# Patient Record
Sex: MALE | Race: WHITE | NOT HISPANIC OR LATINO | Employment: FULL TIME | RURAL
[De-identification: names, ages, dates, MRNs, and addresses within clinical notes are randomized per-mention and may not be internally consistent; named-entity substitution may affect disease eponyms.]

---

## 2017-01-27 ENCOUNTER — OFFICE VISIT (OUTPATIENT)
Dept: RETAIL CLINIC | Facility: CLINIC | Age: 32
End: 2017-01-27

## 2017-01-27 VITALS
HEART RATE: 82 BPM | RESPIRATION RATE: 15 BRPM | BODY MASS INDEX: 21.23 KG/M2 | HEIGHT: 74 IN | OXYGEN SATURATION: 99 % | TEMPERATURE: 98.2 F | WEIGHT: 165.4 LBS

## 2017-01-27 DIAGNOSIS — J01.00 ACUTE MAXILLARY SINUSITIS, RECURRENCE NOT SPECIFIED: Primary | ICD-10-CM

## 2017-01-27 DIAGNOSIS — H65.03 BILATERAL ACUTE SEROUS OTITIS MEDIA, RECURRENCE NOT SPECIFIED: ICD-10-CM

## 2017-01-27 DIAGNOSIS — J02.9 SORE THROAT: ICD-10-CM

## 2017-01-27 LAB
EXPIRATION DATE: NORMAL
INTERNAL CONTROL: NORMAL
Lab: NORMAL
S PYO AG THROAT QL: NEGATIVE

## 2017-01-27 PROCEDURE — 87880 STREP A ASSAY W/OPTIC: CPT | Performed by: NURSE PRACTITIONER

## 2017-01-27 PROCEDURE — 99203 OFFICE O/P NEW LOW 30 MIN: CPT | Performed by: NURSE PRACTITIONER

## 2017-01-27 RX ORDER — AMOXICILLIN AND CLAVULANATE POTASSIUM 875; 125 MG/1; MG/1
1 TABLET, FILM COATED ORAL 2 TIMES DAILY
Qty: 20 TABLET | Refills: 0 | Status: SHIPPED | OUTPATIENT
Start: 2017-01-27 | End: 2017-02-06

## 2017-01-27 RX ORDER — ALUMINUM HYDROXIDE, MAGNESIUM HYDROXIDE, SIMETHICONE 400; 400; 40 MG/10ML; MG/10ML; MG/10ML
30 SUSPENSION ORAL
Qty: 30 ML | Refills: 0 | Status: SHIPPED | OUTPATIENT
Start: 2017-01-27

## 2017-01-27 RX ORDER — LORATADINE 10 MG/1
10 TABLET ORAL DAILY
Qty: 30 TABLET | Refills: 0 | Status: SHIPPED | OUTPATIENT
Start: 2017-01-27 | End: 2017-02-26

## 2017-01-27 NOTE — PATIENT INSTRUCTIONS
Sinusitis, Adult  Sinusitis is redness, soreness, and puffiness (inflammation) of the air pockets in the bones of your face (sinuses). The redness, soreness, and puffiness can cause air and mucus to get trapped in your sinuses. This can allow germs to grow and cause an infection.   HOME CARE   · Drink enough fluids to keep your pee (urine) clear or pale yellow.  · Use a humidifier in your home.  · Run a hot shower to create steam in the bathroom. Sit in the bathroom with the door closed. Breathe in the steam 3-4 times a day.  · Put a warm, moist washcloth on your face 3-4 times a day, or as told by your doctor.  · Use salt water sprays (saline sprays) to wet the thick fluid in your nose. This can help the sinuses drain.  · Only take medicine as told by your doctor.  GET HELP RIGHT AWAY IF:   · Your pain gets worse.  · You have very bad headaches.  · You are sick to your stomach (nauseous).  · You throw up (vomit).  · You are very sleepy (drowsy) all the time.  · Your face is puffy (swollen).  · Your vision changes.  · You have a stiff neck.  · You have trouble breathing.  MAKE SURE YOU:   · Understand these instructions.  · Will watch your condition.  · Will get help right away if you are not doing well or get worse.     This information is not intended to replace advice given to you by your health care provider. Make sure you discuss any questions you have with your health care provider.     Document Released: 06/05/2009 Document Revised: 01/08/2016 Document Reviewed: 07/23/2013  [employer] Interactive Patient Education ©2016 [employer]  Salt Water Gargle  This solution will help make your mouth and throat feel better.  HOME CARE INSTRUCTIONS   · Mix 1 teaspoon of salt in 8 ounces of warm water.  · Gargle with this solution as much or often as you need or as directed. Swish and gargle gently if you have any sores or wounds in your mouth.  · Do not swallow this mixture.     This information is not intended to  replace advice given to you by your health care provider. Make sure you discuss any questions you have with your health care provider.     Document Released: 09/21/2005 Document Revised: 03/11/2013 Document Reviewed: 02/12/2010  [employer] Interactive Patient Education ©2016 [employer].    Sore Throat  A sore throat is a painful, burning, sore, or scratchy feeling of the throat. There may be pain or tenderness when swallowing or talking. You may have other symptoms with a sore throat. These include coughing, sneezing, fever, or a swollen neck. A sore throat is often the first sign of another sickness. These sicknesses may include a cold, flu, strep throat, or an infection called mono. Most sore throats go away without medical treatment.   HOME CARE   · Only take medicine as told by your doctor.  · Drink enough fluids to keep your pee (urine) clear or pale yellow.  · Rest as needed.  · Try using throat sprays, lozenges, or suck on hard candy (if older than 4 years or as told).  · Sip warm liquids, such as broth, herbal tea, or warm water with honey. Try sucking on frozen ice pops or drinking cold liquids.  · Rinse the mouth (gargle) with salt water. Mix 1 teaspoon salt with 8 ounces of water.  · Do not smoke. Avoid being around others when they are smoking.  · Put a humidifier in your bedroom at night to moisten the air. You can also turn on a hot shower and sit in the bathroom for 5-10 minutes. Be sure the bathroom door is closed.  GET HELP RIGHT AWAY IF:   · You have trouble breathing.  · You cannot swallow fluids, soft foods, or your spit (saliva).  · You have more puffiness (swelling) in the throat.  · Your sore throat does not get better in 7 days.  · You feel sick to your stomach (nauseous) and throw up (vomit).  · You have a fever or lasting symptoms for more than 2-3 days.  · You have a fever and your symptoms suddenly get worse.  MAKE SURE YOU:   · Understand these instructions.  · Will watch your  condition.  · Will get help right away if you are not doing well or get worse.     This information is not intended to replace advice given to you by your health care provider. Make sure you discuss any questions you have with your health care provider.     INSTRUCTIONS FOR MAGIC MOUTHWASH: MIX THE VISCOUS LIDOCAINE, BENADRYL, MAALOX AND NYSTATIN TOGETHER IN ONE BOTTLE; MIX WELL PRIOR TO EACH USE; MAY SWISH AND SPIT OR SWISH AND SWALLOW 1 TEASPOON (5ml) EVERY 4 HOURS AS NEEDED FOR SORE THROAT. PATIENT VERBALIZES UNDERSTANDING.-EN    Document Released: 09/26/2009 Document Revised: 09/11/2013 Document Reviewed: 08/25/2013  [employer] Interactive Patient Education ©2016 Elsevier Inc.

## 2017-01-27 NOTE — PROGRESS NOTES
Subjective   Oscar Lizama is a 31 y.o. male that c/o sore throat, neck swelling, headache and sinus pressure that started approximately 1 week ago. Denies recent exposure to known ill persons.     Sore Throat    This is a new problem. The current episode started in the past 7 days. The problem has been gradually worsening. Neither side of throat is experiencing more pain than the other. The maximum temperature recorded prior to his arrival was 100.4 - 100.9 F. The fever has been present for 1 to 2 days. The pain is at a severity of 4/10. The pain is moderate. Associated symptoms include congestion, headaches and swollen glands. Pertinent negatives include no abdominal pain, coughing, diarrhea, ear pain, shortness of breath, stridor, trouble swallowing or vomiting. He has had no exposure to strep or mono. He has tried nothing for the symptoms.        The following portions of the patient's history were reviewed and updated as appropriate: allergies, current medications, past family history, past medical history, past social history, past surgical history and problem list.    Review of Systems   Constitutional: Positive for fever. Negative for appetite change, chills and fatigue.   HENT: Positive for congestion, postnasal drip, sinus pressure and sore throat. Negative for ear pain, sneezing and trouble swallowing.    Eyes: Negative for redness.   Respiratory: Negative for cough, shortness of breath and stridor.    Cardiovascular: Negative for palpitations.   Gastrointestinal: Negative for abdominal pain, diarrhea, nausea and vomiting.   Musculoskeletal: Negative for myalgias.   Skin: Negative for rash.   Neurological: Positive for headaches. Negative for dizziness.       Objective   Physical Exam   Constitutional: He is oriented to person, place, and time. He appears well-developed and well-nourished.   HENT:   Head: Normocephalic and atraumatic.   Right Ear: Hearing, external ear and ear canal normal. Tympanic  membrane is not erythematous. A middle ear effusion (mild) is present.   Left Ear: Hearing, external ear and ear canal normal. Tympanic membrane is not erythematous. A middle ear effusion (mild) is present.   Nose: Mucosal edema (moderate) present. Right sinus exhibits maxillary sinus tenderness and frontal sinus tenderness. Left sinus exhibits maxillary sinus tenderness and frontal sinus tenderness.   Mouth/Throat: Mucous membranes are dry. No uvula swelling. Posterior oropharyngeal erythema (moderate) present. No oropharyngeal exudate.       Tonsils absent; small circular oral lesion noted; no blistering noted     Eyes: Conjunctivae are normal. Pupils are equal, round, and reactive to light.   Neck: Normal range of motion.   Cardiovascular: Normal rate, regular rhythm and normal heart sounds.    No murmur heard.  Pulmonary/Chest: Effort normal and breath sounds normal. No respiratory distress.   Lymphadenopathy:     He has cervical adenopathy (bilateral anterior).   Neurological: He is alert and oriented to person, place, and time.   Skin: Skin is warm and dry. No rash noted.   Psychiatric: He has a normal mood and affect. His behavior is normal. Judgment and thought content normal.   Vitals reviewed.      Assessment/Plan   Oscar was seen today for sore throat.    Diagnoses and all orders for this visit:    Sore throat  -     POC Rapid Strep A  -     lidocaine viscous (XYLOCAINE) 2 % solution; Take 5 mL by mouth As Needed for mild pain (1-3).  -     nystatin (MYCOSTATIN) 323529 UNIT/ML suspension; Swish and swallow 5 mL 4 (Four) Times a Day.  -     diphenhydrAMINE (BENADRYL) 12.5 MG/5ML liquid; Take 10 mL by mouth 4 (Four) Times a Day As Needed (sore throat).  -     aluminum-magnesium hydroxide-simethicone (MAALOX) 200-200-20 MG/5ML suspension; Take 30 mL by mouth 4 (Four) Times a Day Before Meals & at Bedtime.    Bilateral acute serous otitis media, recurrence not specified  -     loratadine (CLARITIN) 10 MG  tablet; Take 1 tablet by mouth Daily for 30 days.    Acute maxillary sinusitis, recurrence not specified  -     amoxicillin-clavulanate (AUGMENTIN) 875-125 MG per tablet; Take 1 tablet by mouth 2 (Two) Times a Day for 10 days.    Instructed patient regarding medication use. Patient verbalizes understanding.-EN

## 2022-11-27 NOTE — MR AVS SNAPSHOT
Oscar Lizama   1/27/2017 10:30 AM   Office Visit    Dept Phone:  [phone]   Encounter #:  [id number]    Provider:  SITA DOMINIQUE   Department:  [facility]                Your Full Care Plan              Today's Medication Changes          These changes are accurate as of: 1/27/17 11:24 AM.  If you have any questions, ask your nurse or doctor.               New Medication(s)Ordered:     aluminum-magnesium hydroxide-simethicone 200-200-20 MG/5ML suspension   Commonly known as:  MAALOX   Take 30 mL by mouth 4 (Four) Times a Day Before Meals & at Bedtime.       amoxicillin-clavulanate 875-125 MG per tablet   Commonly known as:  AUGMENTIN   Take 1 tablet by mouth 2 (Two) Times a Day for 10 days.       diphenhydrAMINE 12.5 MG/5ML liquid   Commonly known as:  BENADRYL   Take 10 mL by mouth 4 (Four) Times a Day As Needed (sore throat).       lidocaine viscous 2 % solution   Commonly known as:  XYLOCAINE   Take 5 mL by mouth As Needed for mild pain (1-3).       loratadine 10 MG tablet   Commonly known as:  CLARITIN   Take 1 tablet by mouth Daily for 30 days.       nystatin 206193 UNIT/ML suspension   Commonly known as:  MYCOSTATIN   Swish and swallow 5 mL 4 (Four) Times a Day.            Where to Get Your Medications      These medications were sent to [facility] [address] [place], [place] [address]ON [facility] - [phone]  - [phone] [facility] [employer] [facility] [address]     Phone:  [phone]     aluminum-magnesium hydroxide-simethicone 200-200-20 MG/5ML suspension    amoxicillin-clavulanate 875-125 MG per tablet    diphenhydrAMINE 12.5 MG/5ML liquid    lidocaine viscous 2 % solution    loratadine 10 MG tablet    nystatin 215043 UNIT/ML suspension                  Your Updated Medication List          This list is accurate as of: 1/27/17 11:24 AM.  Always use your most recent med list.                aluminum-magnesium hydroxide-simethicone 200-200-20 MG/5ML suspension      Commonly known as:  MAALOX   Take 30 mL by mouth 4 (Four) Times a Day Before Meals & at Bedtime.       amoxicillin-clavulanate 875-125 MG per tablet   Commonly known as:  AUGMENTIN   Take 1 tablet by mouth 2 (Two) Times a Day for 10 days.       diphenhydrAMINE 12.5 MG/5ML liquid   Commonly known as:  BENADRYL   Take 10 mL by mouth 4 (Four) Times a Day As Needed (sore throat).       lidocaine viscous 2 % solution   Commonly known as:  XYLOCAINE   Take 5 mL by mouth As Needed for mild pain (1-3).       loratadine 10 MG tablet   Commonly known as:  CLARITIN   Take 1 tablet by mouth Daily for 30 days.       nystatin [id number] UNIT/ML suspension   Commonly known as:  MYCOSTATIN   Swish and swallow 5 mL 4 (Four) Times a Day.               We Performed the Following     POC Rapid Strep A       You Were Diagnosed With        Codes Comments    Sore throat    -  Primary ICD-10-CM: J02.9  ICD-9-CM: 462     Bilateral acute serous otitis media, recurrence not specified     ICD-10-CM: H65.03  ICD-9-CM: 381.01     Acute maxillary sinusitis, recurrence not specified     ICD-10-CM: J01.00  ICD-9-CM: 461.0       Instructions    Sinusitis, Adult  Sinusitis is redness, soreness, and puffiness (inflammation) of the air pockets in the bones of your face (sinuses). The redness, soreness, and puffiness can cause air and mucus to get trapped in your sinuses. This can allow germs to grow and cause an infection.   HOME CARE   · Drink enough fluids to keep your pee (urine) clear or pale yellow.  · Use a humidifier in your home.  · Run a hot shower to create steam in the bathroom. Sit in the bathroom with the door closed. Breathe in the steam 3-4 times a day.  · Put a warm, moist washcloth on your face 3-4 times a day, or as told by your doctor.  · Use salt water sprays (saline sprays) to wet the thick fluid in your nose. This can help the sinuses drain.  · Only take medicine as told by your doctor.  GET HELP RIGHT AWAY IF:   · Your pain gets  worse.  · You have very bad headaches.  · You are sick to your stomach (nauseous).  · You throw up (vomit).  · You are very sleepy (drowsy) all the time.  · Your face is puffy (swollen).  · Your vision changes.  · You have a stiff neck.  · You have trouble breathing.  MAKE SURE YOU:   · Understand these instructions.  · Will watch your condition.  · Will get help right away if you are not doing well or get worse.     This information is not intended to replace advice given to you by your health care provider. Make sure you discuss any questions you have with your health care provider.     Document Released: 06/05/2009 Document Revised: 01/08/2016 Document Reviewed: 07/23/2013  [employer] Patient Education ©2016 [employer] Inc.  Salt Water Gargle  This solution will help make your mouth and throat feel better.  HOME CARE INSTRUCTIONS   · Mix 1 teaspoon of salt in 8 ounces of warm water.  · Gargle with this solution as much or often as you need or as directed. Swish and gargle gently if you have any sores or wounds in your mouth.  · Do not swallow this mixture.     This information is not intended to replace advice given to you by your health care provider. Make sure you discuss any questions you have with your health care provider.     Document Released: 09/21/2005 Document Revised: 03/11/2013 Document Reviewed: 02/12/2010  [employer] Patient Education ©2016 [employer] Inc.    Sore Throat  A sore throat is a painful, burning, sore, or scratchy feeling of the throat. There may be pain or tenderness when swallowing or talking. You may have other symptoms with a sore throat. These include coughing, sneezing, fever, or a swollen neck. A sore throat is often the first sign of another sickness. These sicknesses may include a cold, flu, strep throat, or an infection called mono. Most sore throats go away without medical treatment.   HOME CARE   · Only take medicine as told by your doctor.  · Drink enough  fluids to keep your pee (urine) clear or pale yellow.  · Rest as needed.  · Try using throat sprays, lozenges, or suck on hard candy (if older than 4 years or as told).  · Sip warm liquids, such as broth, herbal tea, or warm water with honey. Try sucking on frozen ice pops or drinking cold liquids.  · Rinse the mouth (gargle) with salt water. Mix 1 teaspoon salt with 8 ounces of water.  · Do not smoke. Avoid being around others when they are smoking.  · Put a humidifier in your bedroom at night to moisten the air. You can also turn on a hot shower and sit in the bathroom for 5-10 minutes. Be sure the bathroom door is closed.  GET HELP RIGHT AWAY IF:   · You have trouble breathing.  · You cannot swallow fluids, soft foods, or your spit (saliva).  · You have more puffiness (swelling) in the throat.  · Your sore throat does not get better in 7 days.  · You feel sick to your stomach (nauseous) and throw up (vomit).  · You have a fever or lasting symptoms for more than 2-3 days.  · You have a fever and your symptoms suddenly get worse.  MAKE SURE YOU:   · Understand these instructions.  · Will watch your condition.  · Will get help right away if you are not doing well or get worse.     This information is not intended to replace advice given to you by your health care provider. Make sure you discuss any questions you have with your health care provider.     INSTRUCTIONS FOR MAGIC MOUTHWASH: MIX THE VISCOUS LIDOCAINE, BENADRYL, MAALOX AND NYSTATIN TOGETHER IN ONE BOTTLE; MIX WELL PRIOR TO EACH USE; MAY SWISH AND SPIT OR SWISH AND SWALLOW 1 TEASPOON (5ml) EVERY 4 HOURS AS NEEDED FOR SORE THROAT. PATIENT VERBALIZES UNDERSTANDING.-EN    Document Released: 09/26/2009 Document Revised: 09/11/2013 Document Reviewed: 08/25/2013  [email or web address] Interactive Patient Education ©2016 [email or web address]       Patient Instructions History      Upcoming Appointments     Visit Type Date Time Department    NEW PATIENT 1/27/2017 10:30 AM [facility]  "[facility] Signup     [facility] [employer] allows you to send messages to your doctor, view your test results, renew your prescriptions, schedule appointments, and more. To sign up, go to [employer] and click on the Sign Up Now link in the New User? box. Enter your [employer] Activation Code exactly as it appears below along with the last four digits of your Social Security Number and your Date of Birth () to complete the sign-up process. If you do not sign up before the expiration date, you must request a new code.    [employer] Activation Code: [email or web address]  Expires: 2/10/2017 11:23 AM    If you have questions, you can email [email or web address] or call [phone] to talk to our [employer] staff. Remember, [employer] is NOT to be used for urgent needs. For medical emergencies, dial 911.               Other Info from Your Visit           Allergies     No Known Allergies      Reason for Visit     Sore Throat           Vital Signs     Pulse Temperature Respirations Height Weight Oxygen Saturation    82 98.2 °F (36.8 °C) (Oral) 15 74\" (188 cm) 165 lb 6.4 oz (75 kg) 99%    Body Mass Index Smoking Status                21.24 kg/m2 Current Every Day Smoker          Problems and Diagnoses Noted     Sore throat    -  Primary    Middle ear infection        Acute maxillary sinusitis          Results     POC Rapid Strep A      Component Value Standard Range & Units    Rapid Strep A Screen Negative Negative, VALID, INVALID, Not Performed    Internal Control Passed Passed    Lot Number [id number]     Expiration Date                      " no